# Patient Record
Sex: MALE | Race: OTHER | URBAN - METROPOLITAN AREA
[De-identification: names, ages, dates, MRNs, and addresses within clinical notes are randomized per-mention and may not be internally consistent; named-entity substitution may affect disease eponyms.]

---

## 2023-07-03 ENCOUNTER — OFFICE VISIT (OUTPATIENT)
Dept: SURGERY | Facility: CLINIC | Age: 68
End: 2023-07-03

## 2023-07-03 VITALS
SYSTOLIC BLOOD PRESSURE: 109 MMHG | HEIGHT: 66 IN | HEART RATE: 89 BPM | DIASTOLIC BLOOD PRESSURE: 70 MMHG | BODY MASS INDEX: 26.03 KG/M2 | WEIGHT: 162 LBS

## 2023-07-03 DIAGNOSIS — C61 PROSTATE CANCER (HCC): ICD-10-CM

## 2023-07-03 DIAGNOSIS — N32.81 OAB (OVERACTIVE BLADDER): Primary | ICD-10-CM

## 2023-07-03 DIAGNOSIS — N13.8 BPH WITH OBSTRUCTION/LOWER URINARY TRACT SYMPTOMS: ICD-10-CM

## 2023-07-03 DIAGNOSIS — N40.1 BPH WITH OBSTRUCTION/LOWER URINARY TRACT SYMPTOMS: ICD-10-CM

## 2023-07-03 PROCEDURE — 99204 OFFICE O/P NEW MOD 45 MIN: CPT | Performed by: UROLOGY

## 2023-07-03 RX ORDER — GLIPIZIDE 5 MG/1
TABLET ORAL
COMMUNITY

## 2023-07-03 RX ORDER — SOLIFENACIN SUCCINATE 10 MG/1
1 TABLET, FILM COATED ORAL DAILY
COMMUNITY
End: 2023-07-03

## 2023-07-03 RX ORDER — TAMSULOSIN HYDROCHLORIDE 0.4 MG/1
CAPSULE ORAL
COMMUNITY
End: 2023-07-03

## 2023-07-03 RX ORDER — DAPAGLIFLOZIN 5 MG/1
TABLET, FILM COATED ORAL
COMMUNITY
Start: 2023-06-22

## 2023-07-03 RX ORDER — ROSUVASTATIN CALCIUM 20 MG/1
1 TABLET, COATED ORAL DAILY
COMMUNITY
Start: 2022-12-19

## 2023-07-03 RX ORDER — FINASTERIDE 5 MG/1
TABLET, FILM COATED ORAL
COMMUNITY
End: 2023-07-03

## 2023-07-03 RX ORDER — TROSPIUM CHLORIDE 20 MG/1
1 TABLET, FILM COATED ORAL 2 TIMES DAILY
COMMUNITY
Start: 2022-12-20 | End: 2023-07-03

## 2023-07-03 RX ORDER — TOLTERODINE 4 MG/1
1 CAPSULE, EXTENDED RELEASE ORAL DAILY
COMMUNITY
End: 2023-07-03

## 2023-07-03 RX ORDER — OFLOXACIN 3 MG/ML
SOLUTION/ DROPS OPHTHALMIC
COMMUNITY
End: 2023-07-03

## 2023-07-03 RX ORDER — DARIFENACIN HYDROBROMIDE 15 MG/1
1 TABLET, EXTENDED RELEASE ORAL DAILY
COMMUNITY
End: 2023-07-03

## 2023-07-03 RX ORDER — LISINOPRIL 2.5 MG/1
1 TABLET ORAL DAILY
COMMUNITY
Start: 2022-08-29

## 2023-07-03 RX ORDER — CARVEDILOL 12.5 MG/1
1 TABLET ORAL 2 TIMES DAILY WITH MEALS
COMMUNITY
Start: 2022-08-29

## 2023-07-03 RX ORDER — CYCLOBENZAPRINE HCL 5 MG
TABLET ORAL
COMMUNITY
End: 2023-07-03

## 2023-07-03 RX ORDER — PANTOPRAZOLE SODIUM 40 MG/1
1 TABLET, DELAYED RELEASE ORAL DAILY
COMMUNITY
End: 2023-07-03

## 2023-07-03 RX ORDER — NAPROXEN 500 MG/1
TABLET ORAL
COMMUNITY
End: 2023-07-03

## 2023-07-03 RX ORDER — METOCLOPRAMIDE 5 MG/1
TABLET ORAL
COMMUNITY
End: 2023-07-03

## 2023-07-03 RX ORDER — ASPIRIN 81 MG/1
81 TABLET ORAL DAILY
COMMUNITY

## 2023-07-03 RX ORDER — CLOPIDOGREL BISULFATE 75 MG/1
75 TABLET ORAL AS DIRECTED
COMMUNITY
End: 2023-07-03

## 2023-08-30 ENCOUNTER — OFFICE VISIT (OUTPATIENT)
Dept: SURGERY | Facility: CLINIC | Age: 68
End: 2023-08-30

## 2023-08-30 VITALS — DIASTOLIC BLOOD PRESSURE: 76 MMHG | SYSTOLIC BLOOD PRESSURE: 107 MMHG | HEART RATE: 90 BPM

## 2023-08-30 DIAGNOSIS — N32.81 OAB (OVERACTIVE BLADDER): Primary | ICD-10-CM

## 2023-08-30 DIAGNOSIS — C61 PROSTATE CANCER (HCC): ICD-10-CM

## 2023-08-30 DIAGNOSIS — N13.8 BPH WITH OBSTRUCTION/LOWER URINARY TRACT SYMPTOMS: ICD-10-CM

## 2023-08-30 DIAGNOSIS — N40.1 BPH WITH OBSTRUCTION/LOWER URINARY TRACT SYMPTOMS: ICD-10-CM

## 2023-08-30 PROCEDURE — 99214 OFFICE O/P EST MOD 30 MIN: CPT | Performed by: UROLOGY

## 2023-09-19 ENCOUNTER — TELEPHONE (OUTPATIENT)
Dept: SURGERY | Facility: CLINIC | Age: 68
End: 2023-09-19

## 2023-09-19 NOTE — TELEPHONE ENCOUNTER
Patient has procedure on 9/26 and asking for arrival time and medication he is to begin. Please call at [phone].

## 2023-09-20 NOTE — TELEPHONE ENCOUNTER
Spoke with pt and informed pt to arrive at 9:20am on 09/26/23 to have the [address] test with the nurses first and at 10:30am Aly Beltran will be doing a cystoscopy. Informed pt that I did not see any new rx that he was supposed to start based on 's last note. Pt verbalized understanding. Reviewed findings at length with patient. Discussed options for management. Recommended further evaluation with cystoscopy and urodynamics in anticipation of possible cystoscopy and Botox injection if no evidence grossly of obstruction is seen. He has a vague history of urethral meatal stenosis and distal urethral stenosis which will also be assessed on cystoscopy.

## 2023-09-26 ENCOUNTER — PROCEDURE (OUTPATIENT)
Dept: SURGERY | Facility: CLINIC | Age: 68
End: 2023-09-26

## 2023-09-26 ENCOUNTER — TELEPHONE (OUTPATIENT)
Dept: SURGERY | Facility: CLINIC | Age: 68
End: 2023-09-26

## 2023-09-26 VITALS — HEART RATE: 96 BPM | SYSTOLIC BLOOD PRESSURE: 101 MMHG | DIASTOLIC BLOOD PRESSURE: 66 MMHG

## 2023-09-26 DIAGNOSIS — N13.8 BPH WITH OBSTRUCTION/LOWER URINARY TRACT SYMPTOMS: Primary | ICD-10-CM

## 2023-09-26 DIAGNOSIS — R39.89 OTHER SYMPTOMS AND SIGNS INVOLVING THE GENITOURINARY SYSTEM: ICD-10-CM

## 2023-09-26 DIAGNOSIS — N40.1 BPH WITH OBSTRUCTION/LOWER URINARY TRACT SYMPTOMS: Primary | ICD-10-CM

## 2023-09-26 DIAGNOSIS — N13.8 BPH WITH URINARY OBSTRUCTION: ICD-10-CM

## 2023-09-26 DIAGNOSIS — N32.81 OAB (OVERACTIVE BLADDER): ICD-10-CM

## 2023-09-26 DIAGNOSIS — Z01.818 PREOP EXAMINATION: Primary | ICD-10-CM

## 2023-09-26 DIAGNOSIS — C61 PROSTATE CANCER (HCC): ICD-10-CM

## 2023-09-26 DIAGNOSIS — N40.1 BPH WITH URINARY OBSTRUCTION: ICD-10-CM

## 2023-09-26 DIAGNOSIS — N32.0 BLADDER NECK CONTRACTURE: ICD-10-CM

## 2023-09-26 PROCEDURE — 51741 ELECTRO-UROFLOWMETRY FIRST: CPT | Performed by: UROLOGY

## 2023-09-26 PROCEDURE — 52000 CYSTOURETHROSCOPY: CPT | Performed by: UROLOGY

## 2023-09-26 PROCEDURE — 51784 ANAL/URINARY MUSCLE STUDY: CPT | Performed by: UROLOGY

## 2023-09-26 PROCEDURE — 51728 CYSTOMETROGRAM W/VP: CPT | Performed by: UROLOGY

## 2023-09-26 PROCEDURE — 99213 OFFICE O/P EST LOW 20 MIN: CPT | Performed by: UROLOGY

## 2023-09-26 NOTE — PROGRESS NOTES
Vaishali Luong is a 77year old male. HPI:   Patient presents with:  Procedure: CMG/Cysto    75-year-old male presents for urodynamics and office cystoscopy in follow-up to visit August 30, 2023. He was referred by my partner Dr. Oliver Little. Has a history of prostate cancer, low risk grade group 1 on active surveillance diagnosed in 2017. Follow-up biopsy May 2022 demonstrated no cancer PSA most recently 2.6. Had a chronic history of BPH. Had a TURP of the prostate in 2019 at an outside hospital.  Has had persistent refractory overactive symptoms. Tried on multiple anticholinergics and beta 3 agonists which have been unsuccessful. No constipation excessive caffeine or alcohol intake. Digital prostate exams performed by Dr. Marly Miller demonstrate a 40 to 50 g smooth prostate symmetric without masses or nodules. Bladder scan for postvoid residual volumes demonstrated appropriate volumes. HISTORY:  Past Medical History:   Diagnosis Date    CAD (coronary artery disease)     DM (diabetes mellitus) ([facility]     HLD (hyperlipidemia)     HTN (hypertension)     Prostate cancer ([facility] 2017      No past surgical history on file. No family history on file. Social History:   Social History     Socioeconomic History    Marital status:    Tobacco Use    Smoking status: Never    Smokeless tobacco: Never        Medications (Active prior to today's visit):  Current Outpatient Medications   Medication Sig Dispense Refill    aspirin (EQ ASPIRIN ADULT LOW DOSE) 81 MG Oral Tab EC Take 1 tablet (81 mg total) by mouth daily. carvedilol 12.5 MG Oral Tab Take 1 tablet (12.5 mg total) by mouth 2 (two) times daily with meals. FARXIGA 5 MG Oral Tab Take 1 tablet every day by oral route for 90 days. glipiZIDE 5 MG Oral Tab Take 1 tablet twice a day by oral route for 90 days. lisinopril 2.5 MG Oral Tab Take 1 tablet (2.5 mg total) by mouth daily.       metFORMIN 500 MG Oral Tab Take 0.5 tablets twice a day by oral route for 90 days. rosuvastatin 20 MG Oral Tab Take 1 tablet (20 mg total) by mouth daily. Allergies:    Simvastatin             MYALGIA      ROS:       PHYSICAL EXAM:   What follows is a brief summary of my interpretation of the patient's urodynamics. Nursing staff reports difficulty inserting the urodynamics catheter. Resistance encountered in the distal urethra and close to the bladder at the bladder neck. The patient also experienced some pain. Postvoid residual on initial catheterization 250 mL. During the filling phase the patient experienced discomfort. Pressure readings on the tracings are not reliable as somewhere in the negative category. The patient was unable to flow with a CMG catheter in place. Again some of the detrusor pressures are in the negative category but the peak detrusor pressure was 26 cm of water. After the catheter was removed the patient was able to void. Maximum flow was 17.6 mL/s, average flow 7.9 mL/s with a voided volume of 173 mL. Isauro Chen  : 1955  Referring Physician: No ref. provider found     Patient presents with:  Procedure: CMG/Cysto          CYSTOSCOPY    Anesthesia:  2% lidocaine gel    Urethra: Abnormal distal urethral strictures/fossa navicularis stricture dilated using a plastic disposable dilators in the office. Proximal to this the urethra is open until a contracture noted at the bladder neck with some significant prostatic tissue regrowth at the apex in the mid prostatic urethra. Pictures taken and submitted into the chart. Prostate / Pelvic: Abnormal significant prostatic tissue regrowth  Bladder: Abnormal grade 3-4 bladder wall trabeculation .   No tumor, stone, diverticulum, or glomerulation  U.O's: Normal  Trabeculation: Grade 3-4      POST CYSTOSCOPY MEDICATIONS: sample one tablet Cipro 500mg given to patient    DIAGNOSIS:     PLAN: See below       ASSESSMENT/PLAN:   Assessment   Oab (overactive bladder)  Bph with obstruction/lower urinary tract symptoms  (primary encounter diagnosis)  Prostate cancer (hcc)    Reviewed findings at length with patient. Discussed options for management. Recommended cystoscopy under anesthesia, laser vaporization of the prostate and incision of bladder neck contracture. Risks side effects and possible complications including but not limited to bleeding, infection were discussed with the patient and he understands and agrees. He will need preoperative medical/cardiac clearance. He will be scheduled accordingly. Orders This Visit:  No orders of the defined types were placed in this encounter.       Meds This Visit:  Requested Prescriptions      No prescriptions requested or ordered in this encounter       Imaging & Referrals:  None     9/26/2023  Mar Newell MD

## 2023-09-26 NOTE — PROGRESS NOTES
- Pt came today to have a CMG study done. Prepared patient per protocol. Pt verified name and . - Pt emptied bladder and then I explained the test to the patient, he verbalized his understanding and agreed to proceed. - I straight cathed the pt using sterile technique, noting significant resistance, so asked Ashley Pena RN to assist.  She was able to advance straight catheter and received a PVR of 250ml.  - Ashley Pena RN then inserted the bladder catheters, utilizing a straight catheter to minimize resistance, and then I inserted the rectal catheter and started the SW infusion. guiding the patient through the individual steps of the CMG. I instilled approx. 300 mL when the patient stated that he could no longer hold the urine. He attempted to urinate, I left the room to give him some privacy. Upon return, pt had been unable to urinatel. - Consulted with B who agreed that I could remove the catheters, and conduct a uroflow test .  So after stopping the CMG test, and removing the catheters, I started a Uroflow test, and pt was then able to urinate 175ml. - Pt then prepped for [facility] cystoscopy which followed the CMG.

## 2023-09-26 NOTE — TELEPHONE ENCOUNTER
Acosta Smith,  This patient needs to be scheduled for surgery as follows    Diagnosis: BPH, bladder neck contracture  Procedure: Cystoscopy, urethral dilation, greenlight laser vaporization of the prostate  Time: 1 hour  [facility]  Anesthesia: General  Antibiotics on-call to the operating room: Rocephin 1 g IV on-call to the OR  Preoperative labs: CBC, BMP, urine culture, EKG. He needs preoperative medical and cardiac clearance. His primary care physician is through the [address] [place] [address] system. He also needs preoperative cardiac clearance given previous history of coronary artery disease. He needs to be cleared to be off of his low-dose aspirin for at least 5 days prior to surgery.

## 2023-09-27 NOTE — TELEPHONE ENCOUNTER
Patient returned call, scheduled Cystoscopy, urethral dilation, greenlight laser vaporization of the prostate, Wednesday 10/11/2023  [facility]/outpatient, went over pre-op/lab instructions. Patient will have all labs done, stop by office  instructions. Called ' ph [phone], no answer, l/vm, informing office of ' request preoperative medical and cardiac clearance. His primary care physician is through the [address] [place] [address] system. He also needs preoperative cardiac clearance given previous history of coronary artery disease. He needs to be cleared to be off of his low-dose aspirin for at least 5 days prior to surgery. Faxed pre-op medical/cardiac clearance [phone]. I will await recommendations.

## 2023-10-02 ENCOUNTER — LAB ENCOUNTER (OUTPATIENT)
Dept: LAB | Facility: HOSPITAL | Age: 68
End: 2023-10-02
Attending: UROLOGY
Payer: MEDICARE

## 2023-10-02 DIAGNOSIS — Z01.818 PREOP EXAMINATION: ICD-10-CM

## 2023-10-02 DIAGNOSIS — Z01.818 PREOPERATIVE EXAMINATION, UNSPECIFIED: Primary | ICD-10-CM

## 2023-10-02 LAB
ANION GAP SERPL CALC-SCNC: 10 MMOL/L (ref 0–18)
ATRIAL RATE: 92 BPM
BASOPHILS # BLD AUTO: 0.05 X10(3) UL (ref 0–0.2)
BASOPHILS NFR BLD AUTO: 0.8 %
BUN BLD-MCNC: 18 MG/DL (ref 7–18)
BUN/CREAT SERPL: 17.6 (ref 10–20)
CALCIUM BLD-MCNC: 9.6 MG/DL (ref 8.5–10.1)
CHLORIDE SERPL-SCNC: 109 MMOL/L (ref 98–112)
CO2 SERPL-SCNC: 22 MMOL/L (ref 21–32)
CREAT BLD-MCNC: 1.02 MG/DL
DEPRECATED RDW RBC AUTO: 42.7 FL (ref 35.1–46.3)
EGFRCR SERPLBLD CKD-EPI 2021: 79 ML/MIN/1.73M2 (ref 60–?)
EOSINOPHIL # BLD AUTO: 0.19 X10(3) UL (ref 0–0.7)
EOSINOPHIL NFR BLD AUTO: 2.9 %
ERYTHROCYTE [DISTWIDTH] IN BLOOD BY AUTOMATED COUNT: 13 % (ref 11–15)
FASTING STATUS PATIENT QL REPORTED: NO
GLUCOSE BLD-MCNC: 374 MG/DL (ref 70–99)
HCT VFR BLD AUTO: 48 %
HGB BLD-MCNC: 15.6 G/DL
IMM GRANULOCYTES # BLD AUTO: 0.02 X10(3) UL (ref 0–1)
IMM GRANULOCYTES NFR BLD: 0.3 %
LYMPHOCYTES # BLD AUTO: 2.09 X10(3) UL (ref 1–4)
LYMPHOCYTES NFR BLD AUTO: 32.4 %
MCH RBC QN AUTO: 29.1 PG (ref 26–34)
MCHC RBC AUTO-ENTMCNC: 32.5 G/DL (ref 31–37)
MCV RBC AUTO: 89.6 FL
MONOCYTES # BLD AUTO: 0.43 X10(3) UL (ref 0.1–1)
MONOCYTES NFR BLD AUTO: 6.7 %
NEUTROPHILS # BLD AUTO: 3.68 X10 (3) UL (ref 1.5–7.7)
NEUTROPHILS # BLD AUTO: 3.68 X10(3) UL (ref 1.5–7.7)
NEUTROPHILS NFR BLD AUTO: 56.9 %
OSMOLALITY SERPL CALC.SUM OF ELEC: 309 MOSM/KG (ref 275–295)
P AXIS: 22 DEGREES
P-R INTERVAL: 150 MS
PLATELET # BLD AUTO: 176 10(3)UL (ref 150–450)
POTASSIUM SERPL-SCNC: 4.1 MMOL/L (ref 3.5–5.1)
Q-T INTERVAL: 358 MS
QRS DURATION: 102 MS
QTC CALCULATION (BEZET): 442 MS
R AXIS: -16 DEGREES
RBC # BLD AUTO: 5.36 X10(6)UL
SODIUM SERPL-SCNC: 141 MMOL/L (ref 136–145)
T AXIS: -42 DEGREES
VENTRICULAR RATE: 92 BPM
WBC # BLD AUTO: 6.5 X10(3) UL (ref 4–11)

## 2023-10-02 PROCEDURE — 87086 URINE CULTURE/COLONY COUNT: CPT | Performed by: UROLOGY

## 2023-10-02 PROCEDURE — 85025 COMPLETE CBC W/AUTO DIFF WBC: CPT

## 2023-10-02 PROCEDURE — 93005 ELECTROCARDIOGRAM TRACING: CPT

## 2023-10-02 PROCEDURE — 36415 COLL VENOUS BLD VENIPUNCTURE: CPT

## 2023-10-02 PROCEDURE — 93010 ELECTROCARDIOGRAM REPORT: CPT | Performed by: INTERNAL MEDICINE

## 2023-10-02 PROCEDURE — 80048 BASIC METABOLIC PNL TOTAL CA: CPT

## 2023-10-06 ENCOUNTER — TELEPHONE (OUTPATIENT)
Dept: SURGERY | Facility: CLINIC | Age: 68
End: 2023-10-06

## 2023-10-06 NOTE — TELEPHONE ENCOUNTER
I s/w pt and informed him of KHB's results msg as stated below and pt verbalized understanding and compliance.

## 2023-10-06 NOTE — TELEPHONE ENCOUNTER
----- Message from Tania Simeon MD sent at 10/6/2023 12:12 PM CDT -----  Urology staff,  Notify patient I reviewed his labs. His glucose levels are high although I know that he is not fasting. He is a known diabetic and I suggest that he discuss with his primary these test results prior to his scheduled surgery next week Wednesday to see if adjustments in his diabetes medications are required. Otherwise we will plan on proceeding as discussed.

## 2023-10-10 ENCOUNTER — TELEPHONE (OUTPATIENT)
Dept: SURGERY | Facility: CLINIC | Age: 68
End: 2023-10-10

## 2023-10-11 ENCOUNTER — ANESTHESIA EVENT (OUTPATIENT)
Dept: SURGERY | Facility: HOSPITAL | Age: 68
End: 2023-10-11
Payer: MEDICARE

## 2023-10-11 ENCOUNTER — ANESTHESIA (OUTPATIENT)
Dept: SURGERY | Facility: HOSPITAL | Age: 68
End: 2023-10-11
Payer: MEDICARE

## 2023-10-11 ENCOUNTER — HOSPITAL ENCOUNTER (OUTPATIENT)
Facility: HOSPITAL | Age: 68
Setting detail: HOSPITAL OUTPATIENT SURGERY
Discharge: HOME | End: 2023-10-11
Attending: UROLOGY | Admitting: UROLOGY
Payer: MEDICARE

## 2023-10-11 ENCOUNTER — TELEPHONE (OUTPATIENT)
Dept: SURGERY | Facility: CLINIC | Age: 68
End: 2023-10-11

## 2023-10-11 VITALS
DIASTOLIC BLOOD PRESSURE: 67 MMHG | TEMPERATURE: 98 F | SYSTOLIC BLOOD PRESSURE: 114 MMHG | HEIGHT: 65 IN | RESPIRATION RATE: 14 BRPM | OXYGEN SATURATION: 97 % | WEIGHT: 171 LBS | BODY MASS INDEX: 28.49 KG/M2 | HEART RATE: 72 BPM

## 2023-10-11 LAB
GLUCOSE BLDC GLUCOMTR-MCNC: 212 MG/DL (ref 70–99)
GLUCOSE BLDC GLUCOMTR-MCNC: 228 MG/DL (ref 70–99)

## 2023-10-11 PROCEDURE — 0T5C8ZZ DESTRUCTION OF BLADDER NECK, VIA NATURAL OR ARTIFICIAL OPENING ENDOSCOPIC: ICD-10-PCS | Performed by: UROLOGY

## 2023-10-11 PROCEDURE — 52214 CYSTOSCOPY AND TREATMENT: CPT | Performed by: UROLOGY

## 2023-10-11 RX ORDER — MORPHINE SULFATE 10 MG/ML
6 INJECTION, SOLUTION INTRAMUSCULAR; INTRAVENOUS EVERY 10 MIN PRN
Status: DISCONTINUED | OUTPATIENT
Start: 2023-10-11 | End: 2023-10-11

## 2023-10-11 RX ORDER — EPHEDRINE SULFATE 50 MG/ML
INJECTION INTRAVENOUS AS NEEDED
Status: DISCONTINUED | OUTPATIENT
Start: 2023-10-11 | End: 2023-10-11 | Stop reason: SURG

## 2023-10-11 RX ORDER — NICOTINE POLACRILEX 4 MG
15 LOZENGE BUCCAL
Status: DISCONTINUED | OUTPATIENT
Start: 2023-10-11 | End: 2023-10-11 | Stop reason: HOSPADM

## 2023-10-11 RX ORDER — DEXTROSE MONOHYDRATE 25 G/50ML
50 INJECTION, SOLUTION INTRAVENOUS
Status: DISCONTINUED | OUTPATIENT
Start: 2023-10-11 | End: 2023-10-11

## 2023-10-11 RX ORDER — CEFADROXIL 500 MG/1
500 CAPSULE ORAL 2 TIMES DAILY
Qty: 6 CAPSULE | Refills: 0 | Status: SHIPPED | OUTPATIENT
Start: 2023-10-12 | End: 2023-10-15

## 2023-10-11 RX ORDER — PHENAZOPYRIDINE HYDROCHLORIDE 200 MG/1
200 TABLET, FILM COATED ORAL ONCE
Status: CANCELLED | OUTPATIENT
Start: 2023-10-11 | End: 2023-10-11

## 2023-10-11 RX ORDER — DEXAMETHASONE SODIUM PHOSPHATE 4 MG/ML
VIAL (ML) INJECTION AS NEEDED
Status: DISCONTINUED | OUTPATIENT
Start: 2023-10-11 | End: 2023-10-11 | Stop reason: SURG

## 2023-10-11 RX ORDER — HYDROMORPHONE HYDROCHLORIDE 1 MG/ML
0.6 INJECTION, SOLUTION INTRAMUSCULAR; INTRAVENOUS; SUBCUTANEOUS EVERY 5 MIN PRN
Status: DISCONTINUED | OUTPATIENT
Start: 2023-10-11 | End: 2023-10-11

## 2023-10-11 RX ORDER — SODIUM CHLORIDE, SODIUM LACTATE, POTASSIUM CHLORIDE, CALCIUM CHLORIDE 600; 310; 30; 20 MG/100ML; MG/100ML; MG/100ML; MG/100ML
INJECTION, SOLUTION INTRAVENOUS CONTINUOUS
Status: DISCONTINUED | OUTPATIENT
Start: 2023-10-11 | End: 2023-10-11

## 2023-10-11 RX ORDER — HYDROMORPHONE HYDROCHLORIDE 1 MG/ML
0.2 INJECTION, SOLUTION INTRAMUSCULAR; INTRAVENOUS; SUBCUTANEOUS EVERY 5 MIN PRN
Status: DISCONTINUED | OUTPATIENT
Start: 2023-10-11 | End: 2023-10-11

## 2023-10-11 RX ORDER — NICOTINE POLACRILEX 4 MG
15 LOZENGE BUCCAL
Status: DISCONTINUED | OUTPATIENT
Start: 2023-10-11 | End: 2023-10-11

## 2023-10-11 RX ORDER — HYDROMORPHONE HYDROCHLORIDE 1 MG/ML
0.4 INJECTION, SOLUTION INTRAMUSCULAR; INTRAVENOUS; SUBCUTANEOUS EVERY 5 MIN PRN
Status: DISCONTINUED | OUTPATIENT
Start: 2023-10-11 | End: 2023-10-11

## 2023-10-11 RX ORDER — PHENYLEPHRINE HCL 10 MG/ML
VIAL (ML) INJECTION AS NEEDED
Status: DISCONTINUED | OUTPATIENT
Start: 2023-10-11 | End: 2023-10-11 | Stop reason: SURG

## 2023-10-11 RX ORDER — MORPHINE SULFATE 4 MG/ML
2 INJECTION, SOLUTION INTRAMUSCULAR; INTRAVENOUS EVERY 10 MIN PRN
Status: DISCONTINUED | OUTPATIENT
Start: 2023-10-11 | End: 2023-10-11

## 2023-10-11 RX ORDER — NALOXONE HYDROCHLORIDE 0.4 MG/ML
0.08 INJECTION, SOLUTION INTRAMUSCULAR; INTRAVENOUS; SUBCUTANEOUS AS NEEDED
Status: DISCONTINUED | OUTPATIENT
Start: 2023-10-11 | End: 2023-10-11

## 2023-10-11 RX ORDER — ACETAMINOPHEN 500 MG
1000 TABLET ORAL ONCE
Status: COMPLETED | OUTPATIENT
Start: 2023-10-11 | End: 2023-10-11

## 2023-10-11 RX ORDER — LIDOCAINE HYDROCHLORIDE 10 MG/ML
INJECTION, SOLUTION EPIDURAL; INFILTRATION; INTRACAUDAL; PERINEURAL AS NEEDED
Status: DISCONTINUED | OUTPATIENT
Start: 2023-10-11 | End: 2023-10-11 | Stop reason: SURG

## 2023-10-11 RX ORDER — NICOTINE POLACRILEX 4 MG
30 LOZENGE BUCCAL
Status: DISCONTINUED | OUTPATIENT
Start: 2023-10-11 | End: 2023-10-11 | Stop reason: HOSPADM

## 2023-10-11 RX ORDER — NICOTINE POLACRILEX 4 MG
30 LOZENGE BUCCAL
Status: DISCONTINUED | OUTPATIENT
Start: 2023-10-11 | End: 2023-10-11

## 2023-10-11 RX ORDER — DEXTROSE MONOHYDRATE 25 G/50ML
50 INJECTION, SOLUTION INTRAVENOUS
Status: DISCONTINUED | OUTPATIENT
Start: 2023-10-11 | End: 2023-10-11 | Stop reason: HOSPADM

## 2023-10-11 RX ORDER — SODIUM CHLORIDE 9 MG/ML
INJECTION, SOLUTION INTRAVENOUS CONTINUOUS
Status: DISCONTINUED | OUTPATIENT
Start: 2023-10-11 | End: 2023-10-11

## 2023-10-11 RX ORDER — ONDANSETRON 2 MG/ML
INJECTION INTRAMUSCULAR; INTRAVENOUS AS NEEDED
Status: DISCONTINUED | OUTPATIENT
Start: 2023-10-11 | End: 2023-10-11 | Stop reason: SURG

## 2023-10-11 RX ORDER — MORPHINE SULFATE 4 MG/ML
4 INJECTION, SOLUTION INTRAMUSCULAR; INTRAVENOUS EVERY 10 MIN PRN
Status: DISCONTINUED | OUTPATIENT
Start: 2023-10-11 | End: 2023-10-11

## 2023-10-11 RX ADMIN — DEXAMETHASONE SODIUM PHOSPHATE 4 MG: 4 MG/ML VIAL (ML) INJECTION at 12:46:00

## 2023-10-11 RX ADMIN — EPHEDRINE SULFATE 5 MG: 50 INJECTION INTRAVENOUS at 12:54:00

## 2023-10-11 RX ADMIN — ONDANSETRON 4 MG: 2 INJECTION INTRAMUSCULAR; INTRAVENOUS at 12:48:00

## 2023-10-11 RX ADMIN — SODIUM CHLORIDE: 9 INJECTION, SOLUTION INTRAVENOUS at 12:46:00

## 2023-10-11 RX ADMIN — LIDOCAINE HYDROCHLORIDE 50 MG: 10 INJECTION, SOLUTION EPIDURAL; INFILTRATION; INTRACAUDAL; PERINEURAL at 12:37:00

## 2023-10-11 RX ADMIN — SODIUM CHLORIDE: 9 INJECTION, SOLUTION INTRAVENOUS at 12:33:00

## 2023-10-11 RX ADMIN — PHENYLEPHRINE HCL 100 MCG: 10 MG/ML VIAL (ML) INJECTION at 12:47:00

## 2023-10-11 NOTE — ANESTHESIA PROCEDURE NOTES
Airway  Date/Time: 10/11/2023 12:40 PM  Urgency: elective    Airway not difficult    General Information and Staff    Patient location during procedure: OR  Anesthesiologist: Rosey Belcher MD  Performed: anesthesiologist   Performed by: Rosey Belcher MD  Authorized by: Rosey Belcher MD      Indications and Patient Condition  Indications for airway management: anesthesia  Spontaneous Ventilation: absent  Sedation level: deep  Preoxygenated: yes  Patient position: sniffing  Mask difficulty assessment: 0 - not attempted    Final Airway Details  Final airway type: supraglottic airway      Successful airway: classic  Size 4       Number of attempts at approach: 1  Number of other approaches attempted: 0

## 2023-10-11 NOTE — INTERVAL H&P NOTE
Pre-op Diagnosis: BPH with urinary obstruction [N40.1, N13.8]  Bladder neck contracture [N32.0]    The above referenced H&P was reviewed by Jairo Willson MD on 10/11/2023, the patient was examined and no significant changes have occurred in the patient's condition since the H&P was performed. I discussed with the patient and/or legal representative the potential benefits, risks and side effects of this procedure; the likelihood of the patient achieving goals; and potential problems that might occur during recuperation. I discussed reasonable alternatives to the procedure, including risks, benefits and side effects related to the alternatives and risks related to not receiving this procedure. We will proceed with procedure as planned.

## 2023-10-11 NOTE — H&P
Mary Garcia is a 77year old male. HPI:   Patient presents with:  Procedure: CMG/Cysto     58-year-old male presents for urodynamics and office cystoscopy in follow-up to visit August 30, 2023. He was referred by my partner Dr. Jo Collier. Has a history of prostate cancer, low risk grade group 1 on active surveillance diagnosed in 2017. Follow-up biopsy May 2022 demonstrated no cancer PSA most recently 2.6. Had a chronic history of BPH. Had a TURP of the prostate in 2019 at an outside hospital.  Has had persistent refractory overactive symptoms. Tried on multiple anticholinergics and beta 3 agonists which have been unsuccessful. No constipation excessive caffeine or alcohol intake. Digital prostate exams performed by Dr. Rivers Led demonstrate a 40 to 50 g smooth prostate symmetric without masses or nodules. Bladder scan for postvoid residual volumes demonstrated appropriate volumes. HISTORY:       Past Medical History:   Diagnosis Date    CAD (coronary artery disease)      DM (diabetes mellitus) ([facility] [address] [address]      HLD (hyperlipidemia)      HTN (hypertension)      [facility]      No past surgical history on file. No family history on file. Social History:   Social History           Socioeconomic History    Marital status:    Tobacco Use    Smoking status: Never    Smokeless tobacco: Never         Medications (Active prior to today's visit):         Current Outpatient Medications   Medication Sig Dispense Refill    aspirin (EQ ASPIRIN ADULT LOW DOSE) 81 MG Oral Tab EC Take 1 tablet (81 mg total) by mouth daily. carvedilol 12.5 MG Oral Tab Take 1 tablet (12.5 mg total) by mouth 2 (two) times daily with meals. FARXIGA 5 MG Oral Tab Take 1 tablet every day by oral route for 90 days. glipiZIDE 5 MG Oral Tab Take 1 tablet twice a day by oral route for 90 days. lisinopril 2.5 MG Oral Tab Take 1 tablet (2.5 mg total) by mouth daily.         metFORMIN 500 MG Oral Tab Take 0.5 tablets twice a day by oral route for 90 days. rosuvastatin 20 MG Oral Tab Take 1 tablet (20 mg total) by mouth daily. Allergies:     Simvastatin             MYALGIA        ROS:   Reviewed her normal     PHYSICAL EXAM:   What follows is a brief summary of my interpretation of the patient's urodynamics. Nursing staff reports difficulty inserting the urodynamics catheter. Resistance encountered in the distal urethra and close to the bladder at the bladder neck. The patient also experienced some pain. Postvoid residual on initial catheterization 250 mL. During the filling phase the patient experienced discomfort. Pressure readings on the tracings are not reliable as somewhere in the negative category. The patient was unable to flow with a CMG catheter in place. Again some of the detrusor pressures are in the negative category but the peak detrusor pressure was 26 cm of water. After the catheter was removed the patient was able to void. Maximum flow was 17.6 mL/s, average flow 7.9 mL/s with a voided volume of 173 mL. Anil Guzman  : 1955  Referring Physician: No ref. provider found      Patient presents with:  Procedure: CMG/Cysto              CYSTOSCOPY     Anesthesia:  2% lidocaine gel     Urethra: Abnormal distal urethral strictures/fossa navicularis stricture dilated using a plastic disposable dilators in the office. Proximal to this the urethra is open until a contracture noted at the bladder neck with some significant prostatic tissue regrowth at the apex in the mid prostatic urethra. Pictures taken and submitted into the chart. Prostate / Pelvic: Abnormal significant prostatic tissue regrowth  Bladder: Abnormal grade 3-4 bladder wall trabeculation .   No tumor, stone, diverticulum, or glomerulation  U.O's: Normal  Trabeculation: Grade 3-4        POST CYSTOSCOPY MEDICATIONS: sample one tablet Cipro 500mg given to patient     DIAGNOSIS:      PLAN: See below     no apparent distress  Intact neurologically grossly  Abdomen soft nontender nondistended      Phallus and testicle exams unremarkable. ASSESSMENT/PLAN:      Assessment  Oab (overactive bladder)  Bph with obstruction/lower urinary tract symptoms  (primary encounter diagnosis)  Prostate cancer (hcc)     Reviewed findings at length with patient. Discussed options for management. Recommended cystoscopy under anesthesia, laser vaporization of the prostate and incision of bladder neck contracture. Risks side effects and possible complications including but not limited to bleeding, infection were discussed with the patient and he understands and agrees. He will need preoperative medical/cardiac clearance. He will be scheduled accordingly.

## 2023-10-11 NOTE — TELEPHONE ENCOUNTER
Urology,  This patient needs a nurse visit in 5 days for Lopez catheter removal.  He needs to see me 2 weeks after that.

## 2023-10-12 NOTE — TELEPHONE ENCOUNTER
Spoke with patient assisted in scheduling nurse visit and two week follow up visit. PT confirmed and verbalized understanding.      Future Appointments   Date Time Provider Eric Anaya   10/16/2023 11:00 AM [address] [place] [address]   10/31/2023  9:50 AM Amandeep Tim MD [facility]

## 2023-10-13 ENCOUNTER — TELEPHONE (OUTPATIENT)
Dept: SURGERY | Facility: CLINIC | Age: 68
End: 2023-10-13

## 2023-10-16 ENCOUNTER — NURSE ONLY (OUTPATIENT)
Dept: SURGERY | Facility: CLINIC | Age: 68
End: 2023-10-16

## 2023-10-16 VITALS — DIASTOLIC BLOOD PRESSURE: 62 MMHG | SYSTOLIC BLOOD PRESSURE: 108 MMHG | RESPIRATION RATE: 16 BRPM | HEART RATE: 76 BPM

## 2023-10-16 DIAGNOSIS — R33.9 URINARY RETENTION: Primary | ICD-10-CM

## 2023-10-16 PROCEDURE — 51700 IRRIGATION OF BLADDER: CPT | Performed by: UROLOGY

## 2023-10-16 NOTE — PROGRESS NOTES
I called pt into the exam room and introduced myself and verified the spelling of his last name and his . I explained that KHWALTER's orders are to remove his godwin cath post green light laser prostate surgery. I noted that the urine color was clear light dipesh colored. I explained the process of a decath and pt agreed to proceed. I then assisted the pt onto the exam table and and I placed him ro sitting position on the table. I then removed the godwin cath from the stat lock at pt's upper inner Lt thigh and pt preferred to remove the stat lock at home and I gave him some adhesive tape pads to get the adhesive off. I then deflated the godwin cath balloon of it's contents ( 10 ml) I  then gently and slowly removed the godwin cath. Pt tolerated this well. I explained to pt that once he gets home he should only drink as he usually would with meals and when he is thirsty and not force fluids in case he does not  urinate. I also informed him that urinary retention can be in the form of no urine output at all over an extended period or small amts of urine production very frequently with eventual feeling of bladder pressure and discomfort. I asked that he also keep his bowels moving daily and the stool soft. I asked that he call with any problems. Pt verbalized understanding and compliance.

## 2023-10-16 NOTE — TELEPHONE ENCOUNTER
IDANIATCB. Told pt to try to reconnect where the cath came apart as he is coming in for a n/v for decath anyway at 11 am for N/V..

## 2023-10-31 ENCOUNTER — OFFICE VISIT (OUTPATIENT)
Dept: SURGERY | Facility: CLINIC | Age: 68
End: 2023-10-31

## 2023-10-31 DIAGNOSIS — R33.9 URINARY RETENTION: Primary | ICD-10-CM

## 2023-10-31 DIAGNOSIS — N32.81 OAB (OVERACTIVE BLADDER): ICD-10-CM

## 2023-10-31 DIAGNOSIS — C61 PROSTATE CANCER (HCC): ICD-10-CM

## 2023-10-31 DIAGNOSIS — N40.1 BPH WITH OBSTRUCTION/LOWER URINARY TRACT SYMPTOMS: ICD-10-CM

## 2023-10-31 DIAGNOSIS — N13.8 BPH WITH OBSTRUCTION/LOWER URINARY TRACT SYMPTOMS: ICD-10-CM

## 2023-10-31 PROCEDURE — 99213 OFFICE O/P EST LOW 20 MIN: CPT | Performed by: UROLOGY

## 2023-10-31 NOTE — PROGRESS NOTES
Minna Leggett is a 77year old male. HPI:   Patient presents with:  BPH: 10/11/23 Greenlight Laser [facility] pt states he freq urination and urgency       68-year-old male status post cystoscopy greenlight laser vaporization of the prostate performed October 11, 2023 presents for postoperative check. Denies any gross hematuria or dysuria. Overall reports better force of stream but his frequency and nocturia have increased somewhat since before surgery. He is unable to void today for bladder scan for postvoid residual volume. Has a history of prostate cancer, low risk on active surveillance. Referred originally by Dr. Stacy Hill. HISTORY:  Past Medical History:   Diagnosis Date    CAD (coronary artery disease)     Diabetes ([facility] [address] [address]     DM (diabetes mellitus) ([facility]     HLD (hyperlipidemia)     HTN (hypertension)     [facility]    Visual impairment     readers      Past Surgical History:   Procedure Laterality Date    CATH DRUG ELUTING STENT        No family history on file. Social History:   Social History     Socioeconomic History    Marital status:    Tobacco Use    Smoking status: Never    Smokeless tobacco: Never   Substance and Sexual Activity    Alcohol use: Not Currently    Drug use: Not Currently        Medications (Active prior to today's visit):  Current Outpatient Medications   Medication Sig Dispense Refill    aspirin (EQ ASPIRIN ADULT LOW DOSE) 81 MG Oral Tab EC Take 1 tablet (81 mg total) by mouth daily. carvedilol 12.5 MG Oral Tab Take 1 tablet (12.5 mg total) by mouth 2 (two) times daily with meals. FARXIGA 5 MG Oral Tab Take 1 tablet every day by oral route for 90 days. glipiZIDE 5 MG Oral Tab Take 1 tablet twice a day by oral route for 90 days. lisinopril 2.5 MG Oral Tab Take 1 tablet (2.5 mg total) by mouth daily. metFORMIN 500 MG Oral Tab 1 tablet (500 mg total) 2 (two) times daily with meals.       rosuvastatin 20 MG Oral Tab Take 1 tablet (20 mg total) by mouth daily. Allergies:    Simvastatin             MYALGIA      ROS:       PHYSICAL EXAM:        ASSESSMENT/PLAN:   Assessment   Urinary retention  (primary encounter diagnosis)  Oab (overactive bladder)  Bph with obstruction/lower urinary tract symptoms  Prostate cancer (hcc)    Recommend:  - Follow-up in 4 weeks. - Bladder scan for postvoid residual volume at next visit. - If stable, will have the patient follow-up with Dr. Sander Caldera for history of low risk prostate cancer for active surveillance. Orders This Visit:  No orders of the defined types were placed in this encounter.       Meds This Visit:  Requested Prescriptions      No prescriptions requested or ordered in this encounter       Imaging & Referrals:  None     10/31/2023  Mello Flood MD

## 2023-11-27 ENCOUNTER — OFFICE VISIT (OUTPATIENT)
Dept: SURGERY | Facility: CLINIC | Age: 68
End: 2023-11-27

## 2023-11-27 DIAGNOSIS — C61 PROSTATE CANCER (HCC): ICD-10-CM

## 2023-11-27 DIAGNOSIS — N40.1 BPH WITH OBSTRUCTION/LOWER URINARY TRACT SYMPTOMS: ICD-10-CM

## 2023-11-27 DIAGNOSIS — N13.8 BPH WITH OBSTRUCTION/LOWER URINARY TRACT SYMPTOMS: ICD-10-CM

## 2023-11-27 DIAGNOSIS — N32.81 OAB (OVERACTIVE BLADDER): ICD-10-CM

## 2023-11-27 DIAGNOSIS — R33.9 URINARY RETENTION: Primary | ICD-10-CM

## 2023-11-27 RX ORDER — SOLIFENACIN SUCCINATE 5 MG/1
5 TABLET, FILM COATED ORAL DAILY
Qty: 90 TABLET | Refills: 3 | Status: SHIPPED | OUTPATIENT
Start: 2023-11-27

## 2023-11-27 NOTE — PROGRESS NOTES
Bowen Rosenbaum is a 77year old male. HPI:     Chief Complaint   Patient presents with    Urinary Symptoms     Follow up       70-year-old male in follow-up to visit October 31, 2023 with a history of BPH, lower urinary tract symptoms status post greenlight laser vaporization October 11, 2023. Continues to report an excellent force of stream much better than preoperative levels but he continues to have frequency and urgency. He takes no prostate medications at present. He denies any dysuria or gross hematuria. He denies any constipation or caffeine or excessive alcohol intake. Has a history of prostate cancer low risk grade group 1 on active surveillance diagnosed in 2017 by Dr. Martinez Snow. Follow-up biopsy May 2022 demonstrated no cancer. Most recent PSA August 23, 2023 2.53 close to baseline. HISTORY:  Past Medical History:   Diagnosis Date    CAD (coronary artery disease)     Diabetes ([facility] [address]     DM (diabetes mellitus) ([facility]     HLD (hyperlipidemia)     HTN (hypertension)     [facility]    Visual impairment     readers      Past Surgical History:   Procedure Laterality Date    CATH DRUG ELUTING STENT        No family history on file. Social History:   Social History     Socioeconomic History    Marital status:    Tobacco Use    Smoking status: Never    Smokeless tobacco: Never   Substance and Sexual Activity    Alcohol use: Not Currently    Drug use: Not Currently        Medications (Active prior to today's visit):  Current Outpatient Medications   Medication Sig Dispense Refill    Solifenacin Succinate (VESICARE) 5 MG Oral Tab Take 1 tablet (5 mg total) by mouth daily. 90 tablet 3    aspirin (EQ ASPIRIN ADULT LOW DOSE) 81 MG Oral Tab EC Take 1 tablet (81 mg total) by mouth daily. carvedilol 12.5 MG Oral Tab Take 1 tablet (12.5 mg total) by mouth 2 (two) times daily with meals. FARXIGA 5 MG Oral Tab Take 1 tablet every day by oral route for 90 days.       glipiZIDE 5 MG Oral Tab Take 1 tablet twice a day by oral route for 90 days. lisinopril 2.5 MG Oral Tab Take 1 tablet (2.5 mg total) by mouth daily. metFORMIN 500 MG Oral Tab 1 tablet (500 mg total) 2 (two) times daily with meals. rosuvastatin 20 MG Oral Tab Take 1 tablet (20 mg total) by mouth daily. Allergies: Allergies   Allergen Reactions    Simvastatin MYALGIA         ROS:       PHYSICAL EXAM:        ASSESSMENT/PLAN:   Assessment   Encounter Diagnoses   Name Primary? Urinary retention Yes    OAB (overactive bladder)     BPH with obstruction/lower urinary tract symptoms     Prostate cancer [facility])        Reviewed findings at length with patient. Recommended a short course of Solifenacin 5 mg daily. Side effects reviewed. This will help with the frequency and urgency that I suspect are related to an overactive bladder. He tells me that he will be leaving town tomorrow for [place] for 3 months and he will return to see me afterwards. If PSA stable and symptoms improved we will turn him back over to Dr. Zonia Chaparro for follow-up to his low risk prostate cancer. Orders This Visit:  No orders of the defined types were placed in this encounter. Meds This Visit:  Requested Prescriptions     Signed Prescriptions Disp Refills    Solifenacin Succinate (VESICARE) 5 MG Oral Tab 90 tablet 3     Sig: Take 1 tablet (5 mg total) by mouth daily.        Imaging & Referrals:  None     11/27/2023  Bull Ren MD

## 2024-01-19 ENCOUNTER — TELEPHONE (OUTPATIENT)
Dept: SURGERY | Facility: CLINIC | Age: 69
End: 2024-01-19

## 2024-01-19 NOTE — TELEPHONE ENCOUNTER
-S/w pt; identity verified with name & .  -Pt reports cont'd incontinence post 10/11/23 Green Light w/ [facility].  -On 23 [facility] prescribed Solifenacin 5mg dly (X90/ 3 refills).  Pt confirms he has taken Rx 2-3 weeks.  -I encouraged pt to continue taking Rx & may take longer to become effective to address his incontinence.  -OV scheduled 24 w/ [facility]; when pt returns home from out-of-town travel.  -Encounter complete.

## 2024-01-19 NOTE — TELEPHONE ENCOUNTER
Patient calling stating after having surgery lately he has been going to the bathroom often and sometimes he can't hold it.Please advise

## 2024-07-08 ENCOUNTER — OFFICE VISIT (OUTPATIENT)
Dept: SURGERY | Facility: CLINIC | Age: 69
End: 2024-07-08

## 2024-07-08 DIAGNOSIS — N32.81 OAB (OVERACTIVE BLADDER): Primary | ICD-10-CM

## 2024-07-08 DIAGNOSIS — N40.1 BPH WITH OBSTRUCTION/LOWER URINARY TRACT SYMPTOMS: ICD-10-CM

## 2024-07-08 DIAGNOSIS — R33.9 URINARY RETENTION: ICD-10-CM

## 2024-07-08 DIAGNOSIS — C61 PROSTATE CANCER (HCC): ICD-10-CM

## 2024-07-08 DIAGNOSIS — N13.8 BPH WITH OBSTRUCTION/LOWER URINARY TRACT SYMPTOMS: ICD-10-CM

## 2024-07-08 PROCEDURE — 99213 OFFICE O/P EST LOW 20 MIN: CPT | Performed by: UROLOGY

## 2024-07-08 NOTE — PROGRESS NOTES
Lázaro Zavala is a 69 year old male.    HPI:     Chief Complaint   Patient presents with    Urinary Symptoms     Incontinence follow up       69-year-old male with a history of low risk prostate cancer grade group 1 on active surveillance diagnosed in 2017 initially by Dr. Fields followed more recently here by Dr. Goetz follow-up biopsy May 2022 demonstrated no cancer most recent PSA August 2023 2.53.    Has BPH, lower urinary tract symptoms.  He is status post greenlight laser vaporization of the prostate performed October 2023.  States his symptoms are mostly stable compared to preoperative levels.  IPSS score is 13 quality-of-life index of 6.  Mostly bothered by daytime frequency urgency on rare occasion urge associated incontinence.  At last visit, I started him on Solifenacin 5 mg daily.  He states he has noticed moderate improvement in symptoms.  He admits to drinking 3 cups of coffee throughout the day.  He has occasional constipation but this does not appear to be very common.  Bladder scan for postvoid residual volume at last visit was unremarkable.    HISTORY:  Past Medical History:    CAD (coronary artery disease)    Diabetes (HCC)    DM (diabetes mellitus) (HCC)    HLD (hyperlipidemia)    HTN (hypertension)    Prostate cancer (HCC)    Visual impairment    readers      Past Surgical History:   Procedure Laterality Date    Cath drug eluting stent        No family history on file.   Social History:   Social History     Socioeconomic History    Marital status:    Tobacco Use    Smoking status: Never    Smokeless tobacco: Never   Substance and Sexual Activity    Alcohol use: Not Currently    Drug use: Not Currently        Medications (Active prior to today's visit):  Current Outpatient Medications   Medication Sig Dispense Refill    Solifenacin Succinate (VESICARE) 5 MG Oral Tab Take 1 tablet (5 mg total) by mouth daily. 90 tablet 3    aspirin (EQ ASPIRIN ADULT LOW DOSE) 81 MG Oral Tab EC Take  1 tablet (81 mg total) by mouth daily.      carvedilol 12.5 MG Oral Tab Take 1 tablet (12.5 mg total) by mouth 2 (two) times daily with meals.      FARXIGA 5 MG Oral Tab Take 1 tablet every day by oral route for 90 days.      glipiZIDE 5 MG Oral Tab Take 1 tablet twice a day by oral route for 90 days.      lisinopril 2.5 MG Oral Tab Take 1 tablet (2.5 mg total) by mouth daily.      metFORMIN 500 MG Oral Tab 1 tablet (500 mg total) 2 (two) times daily with meals.      rosuvastatin 20 MG Oral Tab Take 1 tablet (20 mg total) by mouth daily.         Allergies:  Allergies   Allergen Reactions    Simvastatin MYALGIA         ROS:       PHYSICAL EXAM:        ASSESSMENT/PLAN:   Assessment   Encounter Diagnoses   Name Primary?    OAB (overactive bladder) Yes    Prostate cancer (HCC)     BPH with obstruction/lower urinary tract symptoms     Urinary retention        Recommend:  - Increase the dose of Solifenacin from 5 mg to 10 mg daily.  - I encouraged the patient to consider using a fiber laxatives daily to prevent constipation and I reviewed with him the relationship between constipation and lower urinary tract symptoms.  - I encouraged the patient to decrease his caffeine intake.  - Follow-up in 3 months.  PSA prior to the appointment.  Digital prostate exam at that visit.         Orders This Visit:  No orders of the defined types were placed in this encounter.      Meds This Visit:  Requested Prescriptions      No prescriptions requested or ordered in this encounter       Imaging & Referrals:  None     7/8/2024  Desmond Cordova MD

## 2024-11-07 ENCOUNTER — TELEPHONE (OUTPATIENT)
Dept: SURGERY | Facility: CLINIC | Age: 69
End: 2024-11-07

## 2025-02-20 ENCOUNTER — TELEPHONE (OUTPATIENT)
Dept: SURGERY | Facility: CLINIC | Age: 70
End: 2025-02-20

## 2025-02-20 NOTE — TELEPHONE ENCOUNTER
Per patient's wife calling stating that patient's Vesicare prescription is about to run out, and that it is stated that there are \"no refills\" on the prescription. Per patient's wife is asking if it would be possible for patient to have a refill of the medication to last him until his appointment with Dr. Cordova on 3/11/2025. Please call back.

## 2025-02-21 RX ORDER — SOLIFENACIN SUCCINATE 5 MG/1
5 TABLET, FILM COATED ORAL DAILY
Qty: 90 TABLET | Refills: 0 | Status: SHIPPED | OUTPATIENT
Start: 2025-02-21

## 2025-02-21 NOTE — TELEPHONE ENCOUNTER
Called patient's wife, verified name and  of her  Lázaro. Wife is saying they are currently in [place] for the winter and that they need the medication sent to [place] instead of [place], wife says Lázaro will be out of the medicine until they see Dr. Cordova next month. I let wife know we will sent the refill to the [facility] pharmacy wife provided us with.  Verified patient upcoming appointment on 3/11/2025.   Patient's wife agreed, verbalized understandings and has no further questions.

## 2025-02-25 ENCOUNTER — TELEPHONE (OUTPATIENT)
Dept: SURGERY | Facility: CLINIC | Age: 70
End: 2025-02-25

## 2025-02-25 NOTE — TELEPHONE ENCOUNTER
Spoke with patients spouse. I advised her that I do not see openings for March 6 or 7th, but I assisted in rescheduling for March 11th at 9:50am. She confirmed and verbalized understanding.     Future Appointments   Date Time Provider Department Center   3/11/2025  9:50 AM Desmond Cordova MD [facility]

## 2025-02-25 NOTE — TELEPHONE ENCOUNTER
Patient's wife asking to reschedule 3/4 appointment to 3/6 or 3/7 due to being out of town until 3/5. Patient's wife declined next available appointment on 4/24. Please advise.

## 2025-03-11 ENCOUNTER — OFFICE VISIT (OUTPATIENT)
Dept: SURGERY | Facility: CLINIC | Age: 70
End: 2025-03-11

## 2025-03-11 DIAGNOSIS — N13.8 BPH WITH OBSTRUCTION/LOWER URINARY TRACT SYMPTOMS: Primary | ICD-10-CM

## 2025-03-11 DIAGNOSIS — N40.1 BPH WITH OBSTRUCTION/LOWER URINARY TRACT SYMPTOMS: Primary | ICD-10-CM

## 2025-03-11 PROCEDURE — 99213 OFFICE O/P EST LOW 20 MIN: CPT | Performed by: UROLOGY

## 2025-03-11 RX ORDER — APIXABAN 5 MG/1
5 TABLET, FILM COATED ORAL
COMMUNITY
Start: 2024-12-28

## 2025-03-11 RX ORDER — CLOPIDOGREL BISULFATE 75 MG/1
75 TABLET ORAL
COMMUNITY
Start: 2024-11-25

## 2025-03-11 RX ORDER — PANTOPRAZOLE SODIUM 40 MG/1
1 TABLET, DELAYED RELEASE ORAL DAILY
COMMUNITY
Start: 2022-06-17

## 2025-03-11 NOTE — PROGRESS NOTES
Lázaro Zavala is a 70 year old male.    HPI:     Chief Complaint   Patient presents with    Urinary Symptoms     Overactive badder: 6 month visit pt continues Solifenacin 10 mg daily pt is wearing 1 depends day 1 at night         70-year-old male accompanied by his wife in follow-up to a visit July 8, 2024.  Has a history of low risk prostate cancer grade group 1 on active surveillance diagnosed in 2017 at an outside hospital followed recently by Dr. Goetz with a follow-up repeat biopsy May 2022 demonstrating no cancer.  Most recent PSA August 27, 2024 stable at 2.789.  He is here mostly because of persistent lower urinary tract symptoms most notably frequency, urgency, incontinence.  Remains on Solifenacin 5 mg daily.  No constipation.  Uses 1 depends undergarments during the day and 1 at night.  He states he leaks less at night and during the day.  The patient underwent cystoscopy and greenlight laser vaporization of the prostate October 2023.  He is not sure that if his symptoms improved at all after that treatment.  He did not fill out the IPSS symptom score today.  Denies constipation, excessive caffeine or alcohol intake.  Unable to provide a urine sample today.  Bladder scans for postvoid residual volume previously were unremarkable.      HISTORY:  Past Medical History:    CAD (coronary artery disease)    Diabetes (HCC)    DM (diabetes mellitus) (HCC)    HLD (hyperlipidemia)    HTN (hypertension)    Prostate cancer (HCC)    Visual impairment    readers      Past Surgical History:   Procedure Laterality Date    Cath drug eluting stent        No family history on file.   Social History:   Social History     Socioeconomic History    Marital status:    Tobacco Use    Smoking status: Never    Smokeless tobacco: Never   Substance and Sexual Activity    Alcohol use: Not Currently    Drug use: Not Currently        Medications (Active prior to today's visit):  Current Outpatient Medications   Medication  Sig Dispense Refill    ELIQUIS 5 MG Oral Tab Take 1 tablet (5 mg total) by mouth.      clopidogrel 75 MG Oral Tab Take 1 tablet (75 mg total) by mouth.      pantoprazole 40 MG Oral Tab EC Take 1 tablet (40 mg total) by mouth daily.      Solifenacin Succinate (VESICARE) 5 MG Oral Tab Take 1 tablet (5 mg total) by mouth daily. 90 tablet 0    aspirin (EQ ASPIRIN ADULT LOW DOSE) 81 MG Oral Tab EC Take 1 tablet (81 mg total) by mouth daily.      carvedilol 12.5 MG Oral Tab Take 1 tablet (12.5 mg total) by mouth 2 (two) times daily with meals.      FARXIGA 5 MG Oral Tab Take 1 tablet every day by oral route for 90 days.      glipiZIDE 5 MG Oral Tab Take 1 tablet twice a day by oral route for 90 days.      lisinopril 2.5 MG Oral Tab Take 1 tablet (2.5 mg total) by mouth daily.      metFORMIN 500 MG Oral Tab 1 tablet (500 mg total) 2 (two) times daily with meals.      rosuvastatin 20 MG Oral Tab Take 1 tablet (20 mg total) by mouth daily.         Allergies:  Allergies[1]      ROS:       PHYSICAL EXAM:        ASSESSMENT/PLAN:   Assessment   No diagnosis found.    Recommend:  - Will schedule for office cystoscopy to rule out urethral stricture or bladder neck contractures.  - Urinalysis with microscopy at next visit.  - Call the office if he has any questions or concerns.         Orders This Visit:  No orders of the defined types were placed in this encounter.      Meds This Visit:  Requested Prescriptions      No prescriptions requested or ordered in this encounter       Imaging & Referrals:  None     3/11/2025  Desmond Cordova MD               [1]   Allergies  Allergen Reactions    Simvastatin MYALGIA

## 2025-04-01 ENCOUNTER — PROCEDURE (OUTPATIENT)
Dept: SURGERY | Facility: CLINIC | Age: 70
End: 2025-04-01

## 2025-04-01 ENCOUNTER — TELEPHONE (OUTPATIENT)
Dept: SURGERY | Facility: CLINIC | Age: 70
End: 2025-04-01

## 2025-04-01 VITALS
BODY MASS INDEX: 25.16 KG/M2 | SYSTOLIC BLOOD PRESSURE: 123 MMHG | HEIGHT: 65 IN | WEIGHT: 151 LBS | HEART RATE: 71 BPM | DIASTOLIC BLOOD PRESSURE: 87 MMHG

## 2025-04-01 DIAGNOSIS — N13.8 BPH WITH OBSTRUCTION/LOWER URINARY TRACT SYMPTOMS: Primary | ICD-10-CM

## 2025-04-01 DIAGNOSIS — C61 PROSTATE CANCER (HCC): ICD-10-CM

## 2025-04-01 DIAGNOSIS — N32.81 OAB (OVERACTIVE BLADDER): ICD-10-CM

## 2025-04-01 DIAGNOSIS — N40.1 BPH WITH OBSTRUCTION/LOWER URINARY TRACT SYMPTOMS: Primary | ICD-10-CM

## 2025-04-01 LAB
BILIRUB UR QL: NEGATIVE
CLARITY UR: CLEAR
GLUCOSE UR-MCNC: >1000 MG/DL
HGB UR QL STRIP.AUTO: NEGATIVE
KETONES UR-MCNC: NEGATIVE MG/DL
LEUKOCYTE ESTERASE UR QL STRIP.AUTO: NEGATIVE
NITRITE UR QL STRIP.AUTO: NEGATIVE
PH UR: 5.5 [PH] (ref 5–8)
PROT UR-MCNC: NEGATIVE MG/DL
SP GR UR STRIP: >1.03 (ref 1–1.03)
UROBILINOGEN UR STRIP-ACNC: NORMAL

## 2025-04-01 PROCEDURE — 99213 OFFICE O/P EST LOW 20 MIN: CPT | Performed by: UROLOGY

## 2025-04-01 PROCEDURE — 52000 CYSTOURETHROSCOPY: CPT | Performed by: UROLOGY

## 2025-04-01 RX ORDER — SOLIFENACIN SUCCINATE 10 MG/1
10 TABLET, FILM COATED ORAL DAILY
Qty: 90 TABLET | Refills: 3 | Status: SHIPPED | OUTPATIENT
Start: 2025-04-01

## 2025-04-01 RX ORDER — CIPROFLOXACIN 500 MG/1
500 TABLET, FILM COATED ORAL ONCE
Status: COMPLETED | OUTPATIENT
Start: 2025-04-01 | End: 2025-04-01

## 2025-04-01 RX ADMIN — CIPROFLOXACIN 500 MG: 500 TABLET, FILM COATED ORAL at 14:41:00

## 2025-04-01 NOTE — TELEPHONE ENCOUNTER
I tried to reach pt's spouse and LMTCB. I sent a new script for the Vesicare 10 mg 1 tab by mouth daily, # 90- with 3 refills.

## 2025-04-01 NOTE — TELEPHONE ENCOUNTER
Patient's wife states that the patient had a procedure done this morning, and would like to know any instructions that was given to the patient, as the patient has memory issues. She also would like to confirm directions and dosage for the patient to take the Solifenacin medication. Wife would also like to confirm that a new prescription will be sent to [facility] with the new directions, so that the patient does not run out of the medication. I tried to reach the nurse, but she was not available, during the time of the call.

## 2025-04-01 NOTE — TELEPHONE ENCOUNTER
LOV 4/1/25  Assessment       Encounter Diagnoses   Name Primary?    BPH with obstruction/lower urinary tract symptoms Yes    OAB (overactive bladder)      Prostate cancer (HCC)           Recommended:  -Increase dose of Solifenacin from 5 mg to 10 mg daily.  Side effects reviewed.  -Followup in 6 weeks to assess response.  -Urinalysis with micro to be done today.

## 2025-04-01 NOTE — PROGRESS NOTES
Lázaro Zavala is a 70 year old male.    HPI:     Chief Complaint   Patient presents with    Follow - Up     Pt is here for cystoscopy          70-year-old male in follow-up to visit March 11, 2025.  Has a history of low risk prostate cancer on active surveillance grade group 1 diagnosed 2017 at an outside hospital.  Repeat biopsy May 2022 demonstrated no cancer.  PSA August 27, 2024 2.79, stable    Had persistent lower urinary tract symptoms.  Underwent greenlight laser vaporization of the prostate October 2023.  Symptoms improved but did not resolve.  Diagnosed with refractory overactive symptoms.  Started on Solifenacin 5 mg daily.  Today he states his symptoms are improved compared with levels March 11, 2025.  Wears 1 depends during the day and 1 at night.  Denies constipation, polydipsia, excessive caffeine or alcohol intake.      HISTORY:  Past Medical History:    CAD (coronary artery disease)    Diabetes (HCC)    DM (diabetes mellitus) (HCC)    HLD (hyperlipidemia)    HTN (hypertension)    Prostate cancer (HCC)    Visual impairment    readers      Past Surgical History:   Procedure Laterality Date    Cath drug eluting stent        No family history on file.   Social History:   Social History     Socioeconomic History    Marital status:    Tobacco Use    Smoking status: Never    Smokeless tobacco: Never   Substance and Sexual Activity    Alcohol use: Not Currently    Drug use: Not Currently        Medications (Active prior to today's visit):  Current Outpatient Medications   Medication Sig Dispense Refill    ELIQUIS 5 MG Oral Tab Take 1 tablet (5 mg total) by mouth.      clopidogrel 75 MG Oral Tab Take 1 tablet (75 mg total) by mouth.      pantoprazole 40 MG Oral Tab EC Take 1 tablet (40 mg total) by mouth daily.      Solifenacin Succinate (VESICARE) 5 MG Oral Tab Take 1 tablet (5 mg total) by mouth daily. 90 tablet 0    aspirin (EQ ASPIRIN ADULT LOW DOSE) 81 MG Oral Tab EC Take 1 tablet (81 mg  total) by mouth daily.      carvedilol 12.5 MG Oral Tab Take 1 tablet (12.5 mg total) by mouth 2 (two) times daily with meals.      FARXIGA 5 MG Oral Tab Take 1 tablet every day by oral route for 90 days.      glipiZIDE 5 MG Oral Tab Take 1 tablet twice a day by oral route for 90 days.      lisinopril 2.5 MG Oral Tab Take 1 tablet (2.5 mg total) by mouth daily.      metFORMIN 500 MG Oral Tab 1 tablet (500 mg total) 2 (two) times daily with meals.      rosuvastatin 20 MG Oral Tab Take 1 tablet (20 mg total) by mouth daily.         Allergies:  Allergies[1]      ROS:       PHYSICAL EXAM:   Lázaro Zavala  : 1955  Referring Physician: No ref. provider found     Chief Complaint   Patient presents with    Follow - Up     Pt is here for cystoscopy            CYSTOSCOPY    Anesthesia:  2% lidocaine gel    Urethra: Normal  Prostate / Pelvic: Normal  Bladder: Normal.  No tumor, stone, diverticulum, or glomerulation  U.O's: Normal  Trabeculation: grade 3-4  Open prostate urethra      POST CYSTOSCOPY MEDICATIONS: sample one tablet Cipro 500mg given to patient    DIAGNOSIS:     PLAN: see below       ASSESSMENT/PLAN:   Assessment   Encounter Diagnoses   Name Primary?    BPH with obstruction/lower urinary tract symptoms Yes    OAB (overactive bladder)     Prostate cancer (HCC)        Recommended:  -Increase dose of Solifenacin from 5 mg to 10 mg daily.  Side effects reviewed.  -Followup in 6 weeks to assess response.  -Urinalysis with micro to be done today.         Orders This Visit:  Orders Placed This Encounter   Procedures    Urinalysis, Routine       Meds This Visit:  Requested Prescriptions      No prescriptions requested or ordered in this encounter       Imaging & Referrals:  None     2025  Desmond Cordova MD               [1]   Allergies  Allergen Reactions    Simvastatin MYALGIA

## 2025-04-01 NOTE — TELEPHONE ENCOUNTER
Pt's spouse called back and i informed her that I sent a new script for Vesicare 10 mg and pt can take 2 tabs of the 5 mg to use them up before purchasing the 10 mg. I also told her that pt could possibly experience a slight amt of burning or a tinge of blood after the cysto but if he pushes fluids this should clear after a day or so.

## 2025-05-12 ENCOUNTER — OFFICE VISIT (OUTPATIENT)
Dept: SURGERY | Facility: CLINIC | Age: 70
End: 2025-05-12
Payer: MEDICARE

## 2025-05-12 DIAGNOSIS — N32.81 OAB (OVERACTIVE BLADDER): ICD-10-CM

## 2025-05-12 DIAGNOSIS — N13.8 BPH WITH OBSTRUCTION/LOWER URINARY TRACT SYMPTOMS: Primary | ICD-10-CM

## 2025-05-12 DIAGNOSIS — N40.1 BPH WITH OBSTRUCTION/LOWER URINARY TRACT SYMPTOMS: Primary | ICD-10-CM

## 2025-05-12 DIAGNOSIS — C61 PROSTATE CANCER (HCC): ICD-10-CM

## 2025-05-12 PROCEDURE — 99213 OFFICE O/P EST LOW 20 MIN: CPT | Performed by: UROLOGY

## 2025-05-12 NOTE — PROGRESS NOTES
Lázaro Zavala is a 70 year old male.    HPI:     Chief Complaint   Patient presents with    Follow - Up     Patient is here for a follow up, and needs a refill. Patients wife states that solifenacin medication is not working.          70-year-old male accompanied by his wife in follow-up to a visit March 11, 2025.  Has a history of low risk prostate cancer grade group 1 on active surveillance diagnosed in 2017 at an outside hospital.  Repeat biopsy May 2022 demonstrated no cancer.  PSA August 2024 2.789.    Had BPH and lower urinary tract symptoms.  He underwent greenlight laser vaporization of the prostate October 2023.  Continue to have overactive bladder symptoms postoperatively refractory to treatment with solifenacin at 5 and 10 mg daily.  Reports mostly urge associated incontinence when he has the sensation to urinate.  He uses 3-4 depends 2 or 3 during the day and 1-2 at nighttime.  Still bothered by the symptoms.  Denies use of caffeinated beverages or constipation or alcoholic drinks.      HISTORY:  Past Medical History[1]   Past Surgical History[2]   Family History[3]   Social History: Short Social Hx on File[4]     Medications (Active prior to today's visit):  Current Medications[5]    Allergies:  Allergies[6]      ROS:       PHYSICAL EXAM:        ASSESSMENT/PLAN:   Assessment   Encounter Diagnoses   Name Primary?    BPH with obstruction/lower urinary tract symptoms Yes    OAB (overactive bladder)        Reviewed findings at length with patient.  Recommended:  - Prostate cancer: On active surveillance.  Repeat PSA in the next 3 to 4 weeks.  - Overactive bladder/urge associated incontinence.  Refractory to treatment with anticholinergics.  Cystoscopy at last visit in April demonstrated an open prostatic urethra.  Suggested other treatments including Botox injections and he would like to consider them.  He will call me back if he decides to proceed.         Orders This Visit:  No orders of the defined  types were placed in this encounter.      Meds This Visit:  Requested Prescriptions      No prescriptions requested or ordered in this encounter       Imaging & Referrals:  None     5/12/2025  Desmond Cordova MD               [1]   Past Medical History:   CAD (coronary artery disease)    Diabetes (HCC)    DM (diabetes mellitus) (HCC)    HLD (hyperlipidemia)    HTN (hypertension)    Prostate cancer (HCC)    Visual impairment    readers   [2]   Past Surgical History:  Procedure Laterality Date    Cath drug eluting stent     [3] No family history on file.  [4]   Social History  Socioeconomic History    Marital status:    Tobacco Use    Smoking status: Never    Smokeless tobacco: Never   Substance and Sexual Activity    Alcohol use: Not Currently    Drug use: Not Currently   [5]   Current Outpatient Medications   Medication Sig Dispense Refill    Solifenacin Succinate (VESICARE) 10 MG Oral Tab Take 1 tablet (10 mg total) by mouth daily. 90 tablet 3    ELIQUIS 5 MG Oral Tab Take 1 tablet (5 mg total) by mouth.      clopidogrel 75 MG Oral Tab Take 1 tablet (75 mg total) by mouth.      pantoprazole 40 MG Oral Tab EC Take 1 tablet (40 mg total) by mouth daily.      aspirin (EQ ASPIRIN ADULT LOW DOSE) 81 MG Oral Tab EC Take 1 tablet (81 mg total) by mouth daily.      carvedilol 12.5 MG Oral Tab Take 1 tablet (12.5 mg total) by mouth 2 (two) times daily with meals.      FARXIGA 5 MG Oral Tab Take 1 tablet every day by oral route for 90 days.      glipiZIDE 5 MG Oral Tab Take 1 tablet twice a day by oral route for 90 days.      lisinopril 2.5 MG Oral Tab Take 1 tablet (2.5 mg total) by mouth daily.      metFORMIN 500 MG Oral Tab 1 tablet (500 mg total) 2 (two) times daily with meals.      rosuvastatin 20 MG Oral Tab Take 1 tablet (20 mg total) by mouth daily.     [6]   Allergies  Allergen Reactions    Simvastatin MYALGIA

## 2025-07-14 ENCOUNTER — TELEPHONE (OUTPATIENT)
Dept: SURGERY | Facility: CLINIC | Age: 70
End: 2025-07-14

## 2025-08-18 ENCOUNTER — OFFICE VISIT (OUTPATIENT)
Dept: SURGERY | Facility: CLINIC | Age: 70
End: 2025-08-18

## 2025-08-18 DIAGNOSIS — R39.9 LOWER URINARY TRACT SYMPTOMS: ICD-10-CM

## 2025-08-18 DIAGNOSIS — C61 PROSTATE CANCER (HCC): Primary | ICD-10-CM

## 2025-08-18 PROCEDURE — 99213 OFFICE O/P EST LOW 20 MIN: CPT | Performed by: UROLOGY

## 2025-08-18 PROCEDURE — G2211 COMPLEX E/M VISIT ADD ON: HCPCS | Performed by: UROLOGY

## 2025-08-18 RX ORDER — MIRABEGRON 50 MG/1
50 TABLET, FILM COATED, EXTENDED RELEASE ORAL DAILY
Qty: 90 TABLET | Refills: 3 | Status: SHIPPED | OUTPATIENT
Start: 2025-08-18 | End: 2026-08-13

## 2025-08-22 ENCOUNTER — LAB ENCOUNTER (OUTPATIENT)
Dept: LAB | Facility: HOSPITAL | Age: 70
End: 2025-08-22
Attending: UROLOGY

## 2025-08-22 DIAGNOSIS — C61 PROSTATE CANCER (HCC): ICD-10-CM

## 2025-08-22 LAB — PSA SERPL-MCNC: 2.63 NG/ML (ref ?–4)

## 2025-08-22 PROCEDURE — 84153 ASSAY OF PSA TOTAL: CPT

## 2025-08-22 PROCEDURE — 36415 COLL VENOUS BLD VENIPUNCTURE: CPT

## (undated) DEVICE — UROLOGY DRAIN BAG

## (undated) DEVICE — SOLUTION IRRIG 3000ML 0.9% NACL FLX CONT

## (undated) DEVICE — SLEEVE COMPR M KNEE LEN SGL USE KENDALL SCD

## (undated) DEVICE — Device

## (undated) DEVICE — SOLUTION IRRIG 1000ML ST H2O AQUALITE PLAS

## (undated) DEVICE — BAG DRNGE 2000ML URIN INF CTRL ANTI REFLX

## (undated) DEVICE — SOLUTION IV 1000ML 0.9% NACL PRESERVATIVE

## (undated) DEVICE — CATHETER URETH 18FR BLLN 5CC SIL ALLY W/ SIL

## (undated) DEVICE — GAMMEX® PI HYBRID SIZE 8, STERILE POWDER-FREE SURGICAL GLOVE, POLYISOPRENE AND NEOPRENE BLEND: Brand: GAMMEX

## (undated) DEVICE — SET ADMIN 16ML TBNG L100IN ID0.100IN MACBOR

## (undated) DEVICE — CYSTO PACK: Brand: MEDLINE INDUSTRIES, INC.

## (undated) NOTE — LETTER
11/07/24        Lázaro Zavala  [address] [address]      Dear Lázaro,    Our records indicate that you have outstanding lab work and or testing that was ordered for you and has not yet been completed:  [facility]   To provide you with the best possible care, please complete these orders at your earliest convenience. If you have recently completed these orders please disregard this letter.     If you have any questions please call the office at Dept: [phone].     Thank you,       Desmond Cordova MD

## (undated) NOTE — LETTER
07/14/25        Lázaro Zavala  [address] [address]      Dear Lázaro,    Our records indicate that you have outstanding lab work and or testing that was ordered for you and has not yet been completed:  [facility]     To provide you with the best possible care, please complete these orders at your earliest convenience. If you have recently completed these orders please disregard this letter.     If you have any questions please call the office at [phone].    Thank you,       Urology Staff